# Patient Record
Sex: FEMALE | Race: WHITE | HISPANIC OR LATINO | Employment: FULL TIME | URBAN - METROPOLITAN AREA
[De-identification: names, ages, dates, MRNs, and addresses within clinical notes are randomized per-mention and may not be internally consistent; named-entity substitution may affect disease eponyms.]

---
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
## 2020-02-24 ENCOUNTER — INITIAL PRENATAL (OUTPATIENT)
Dept: OBGYN | Facility: CLINIC | Age: 20
End: 2020-02-24
Payer: COMMERCIAL

## 2020-02-24 VITALS
DIASTOLIC BLOOD PRESSURE: 72 MMHG | BODY MASS INDEX: 29.59 KG/M2 | HEIGHT: 66 IN | WEIGHT: 184.1 LBS | SYSTOLIC BLOOD PRESSURE: 118 MMHG

## 2020-02-24 DIAGNOSIS — N93.8 DUB (DYSFUNCTIONAL UTERINE BLEEDING): ICD-10-CM

## 2020-02-24 DIAGNOSIS — Z32.01 POSITIVE PREGNANCY TEST: ICD-10-CM

## 2020-02-24 DIAGNOSIS — R11.0 NAUSEA: ICD-10-CM

## 2020-02-24 PROCEDURE — 76830 TRANSVAGINAL US NON-OB: CPT | Performed by: OBSTETRICS & GYNECOLOGY

## 2020-02-24 PROCEDURE — 99203 OFFICE O/P NEW LOW 30 MIN: CPT | Mod: 25 | Performed by: OBSTETRICS & GYNECOLOGY

## 2020-02-24 NOTE — PROGRESS NOTES
today  UPT: positive  LMP: 12/30/19 exact  PNV: yes  Phone #[phone]   Pharmacy verified with patient  A little bit of nausea

## 2020-02-24 NOTE — PROGRESS NOTES
"Subjective:      Mary Ann Cruz is a 19 y.o. female who presents with amenorrhea and positive home pregnancy test            HPI patient is a 19-year-old G1 who presents today with amenorrhea and positive home pregnancy test.  She is doing well currently.  She reports a few episodes of mild nausea in the morning without vomiting.  She denies any abdominal or pelvic pain or cramping she.  Denies any bleeding or spotting.  Ports normal bowel and bladder functions.  Patient started prenatal vitamins a month ago.  Pregnancy was unplanned but desired-patient was not using any contraception    ROS all organ systems were reviewed and were negative except for complaints in HPI       Objective:     /72   Ht 1.676 m (5' 6\")   Wt 83.5 kg (184 lb 1.6 oz)   LMP 12/30/2019 (Exact Date)   BMI 29.71 kg/m²      Physical Exam  Vitals signs and nursing note reviewed. Exam conducted with a chaperone present.   Constitutional:       General: She is not in acute distress.     Appearance: Normal appearance. She is not toxic-appearing.   HENT:      Head: Normocephalic and atraumatic.   Eyes:      General:         Right eye: No discharge.         Left eye: No discharge.      Conjunctiva/sclera: Conjunctivae normal.   Neck:      Musculoskeletal: Neck supple. No neck rigidity or muscular tenderness.   Cardiovascular:      Rate and Rhythm: Normal rate and regular rhythm.   Pulmonary:      Effort: Pulmonary effort is normal. No respiratory distress.      Breath sounds: Normal breath sounds. No wheezing.   Abdominal:      General: Abdomen is flat. Bowel sounds are normal. There is no distension.      Palpations: Abdomen is soft.      Tenderness: There is no abdominal tenderness. There is no right CVA tenderness or left CVA tenderness.   Genitourinary:     General: Normal vulva.      Vagina: No vaginal discharge.   Musculoskeletal: Normal range of motion.         General: No swelling or deformity.   Lymphadenopathy:    "   Cervical: No cervical adenopathy.   Skin:     General: Skin is warm and dry.      Coloration: Skin is not jaundiced.      Findings: No bruising.   Neurological:      General: No focal deficit present.      Mental Status: She is alert and oriented to person, place, and time.      Cranial Nerves: No cranial nerve deficit.      Motor: No weakness.      Gait: Gait normal.   Psychiatric:         Mood and Affect: Mood normal.         Behavior: Behavior normal.         Thought Content: Thought content normal.         Judgment: Judgment normal.            Transvaginal ultrasound was performed and read by me:    Hansen intrauterine pregnancy noted  Fetal heart rate was 171 bpm  Crown-rump length measurement gives a gestational age of 8 weeks and 1 day  EDC by CRL is 10/4/2020  EDC by LMP is 10/5/2020  No adnexal masses noted  No pelvic free fluid noted    Impression: Hansen intrauterine pregnancy at 8 weeks and 1 day gestation with final EDC of 10/5/2020     Assessment/Plan:       1.  19-year-old G1 presented with amenorrhea and positive home pregnancy test.  Normal intrauterine pregnancy confirmed at 8 weeks and 1 day gestation    - POCT Pregnancy  Ultrasound findings discussed  Pregnancy precautions and restrictions were reviewed  Patient to continue prenatal vitamins  2. Positive pregnancy test      3. Nausea  Treatment discussed including dietary modification    4.  Precautions and plan of care reviewed.  Patient to follow-up in 2 weeks for new OB

## 2020-03-04 ENCOUNTER — OFFICE VISIT (OUTPATIENT)
Dept: URGENT CARE | Facility: CLINIC | Age: 20
End: 2020-03-04
Payer: COMMERCIAL

## 2020-03-04 VITALS
WEIGHT: 182.2 LBS | DIASTOLIC BLOOD PRESSURE: 74 MMHG | HEIGHT: 66 IN | RESPIRATION RATE: 12 BRPM | OXYGEN SATURATION: 97 % | HEART RATE: 111 BPM | BODY MASS INDEX: 29.28 KG/M2 | SYSTOLIC BLOOD PRESSURE: 112 MMHG | TEMPERATURE: 99.4 F

## 2020-03-04 DIAGNOSIS — J11.1 INFLUENZA-LIKE ILLNESS: ICD-10-CM

## 2020-03-04 PROCEDURE — 99214 OFFICE O/P EST MOD 30 MIN: CPT | Performed by: NURSE PRACTITIONER

## 2020-03-04 RX ORDER — OSELTAMIVIR PHOSPHATE 75 MG/1
75 CAPSULE ORAL 2 TIMES DAILY
Qty: 10 CAP | Refills: 0 | Status: SHIPPED | OUTPATIENT
Start: 2020-03-04 | End: 2020-03-09

## 2020-03-04 ASSESSMENT — ENCOUNTER SYMPTOMS
ABDOMINAL PAIN: 0
EYE DISCHARGE: 0
EYE REDNESS: 0
SHORTNESS OF BREATH: 0
FEVER: 1
COUGH: 1
PALPITATIONS: 0
HEADACHES: 0
WHEEZING: 0
MYALGIAS: 1
SORE THROAT: 0
STRIDOR: 0
CHILLS: 1
DIARRHEA: 0
CONSTIPATION: 0
NAUSEA: 1
VOMITING: 0

## 2020-03-04 NOTE — LETTER
March 4, 2020         Patient: Mary Ann Cruz   YOB: 2000   Date of Visit: 3/4/2020           To Whom it May Concern:    Mary Ann Cruz was seen in my clinic on 3/4/2020. Please excuse her from work 3/4/2020 through 3/7/2020. She may return on 3/8/2020.    If you have any questions or concerns, please don't hesitate to call.        Sincerely,           WILDA Mejia.  Electronically Signed

## 2020-03-05 NOTE — PROGRESS NOTES
Subjective:   Mary Ann Cruz is a 19 y.o. female who presents for Cough (x 3 days.  Pt. has cough, congestion, bodyaches and bilateral plugged ears. Pt. is 9 weeks pregnant.)        Cough   This is a new problem. The current episode started in the past 7 days (Started 2 days ago). The problem has been waxing and waning. The problem occurs hourly. The cough is non-productive. Associated symptoms include chills, ear congestion, a fever, myalgias, nasal congestion and postnasal drip. Pertinent negatives include no chest pain, ear pain, eye redness, headaches, rash, sore throat, shortness of breath or wheezing. She has tried nothing for the symptoms. The treatment provided no relief.        Patient is currently 9 weeks pregnant.    Review of Systems   Constitutional: Positive for chills, fever and malaise/fatigue.   HENT: Positive for congestion and postnasal drip. Negative for ear discharge, ear pain and sore throat.    Eyes: Negative for discharge and redness.   Respiratory: Positive for cough. Negative for shortness of breath, wheezing and stridor.    Cardiovascular: Negative for chest pain and palpitations.   Gastrointestinal: Positive for nausea. Negative for abdominal pain, constipation, diarrhea and vomiting.   Musculoskeletal: Positive for myalgias.   Skin: Negative for itching and rash.   Neurological: Negative for headaches.   All other systems reviewed and are negative.    PMH:  has no past medical history of Addisons disease ([facility]), Adrenal disorder ([facility]), Allergy, Anemia, Anxiety, Arrhythmia, Arthritis, Asthma, Blood transfusion without reported diagnosis, Cancer ([facility]), Cataract, CHF (congestive heart failure) ([facility]), Clotting disorder ([facility]), COPD (chronic obstructive pulmonary disease) ([facility]), Cushings syndrome ([facility]), Depression, Diabetes ([facility]), Diabetic neuropathy ([facility]), GERD (gastroesophageal reflux disease), Glaucoma, Goiter, Head ache, Heart attack ([facility]), Heart murmur, HIV (human  "immunodeficiency virus infection) (HCC), Hyperlipidemia, Hypertension, IBD (inflammatory bowel disease), Kidney disease, Meningitis, Migraine, Muscle disorder, Osteoporosis, Parathyroid disorder (HCC), Pituitary disease (HCC), Pulmonary emphysema (HCC), Seizure (HCC), Sickle cell disease (HCC), Stroke (HCC), Substance abuse (HCC), Thyroid disease, Tuberculosis, or Urinary tract infection.  ALLERGIES: No Known Allergies    Patient's PMH, SocHx, SurgHx, FamHx, Drug allergies and medications reviewed.     Objective:   /74   Pulse (!) 111   Temp 37.4 °C (99.4 °F) (Temporal)   Resp 12   Ht 1.676 m (5' 6\")   Wt 82.6 kg (182 lb 3.2 oz)   LMP 12/30/2019 (Exact Date)   SpO2 97%   BMI 29.41 kg/m²   Physical Exam  Vitals signs reviewed.   Constitutional:       Appearance: She is well-developed.   HENT:      Head: Normocephalic.      Right Ear: Hearing, tympanic membrane and ear canal normal. No middle ear effusion. Tympanic membrane is not perforated or erythematous.      Left Ear: Hearing, tympanic membrane and ear canal normal.  No middle ear effusion. Tympanic membrane is not perforated or erythematous.      Nose: Mucosal edema and congestion present. No rhinorrhea.      Right Sinus: No maxillary sinus tenderness or frontal sinus tenderness.      Left Sinus: No maxillary sinus tenderness or frontal sinus tenderness.      Mouth/Throat:      Lips: Pink.      Mouth: Mucous membranes are moist.      Pharynx: Uvula midline. Oropharyngeal exudate present. No posterior oropharyngeal erythema or uvula swelling.      Tonsils: No tonsillar exudate. 2+ on the right. 2+ on the left.   Eyes:      General: Lids are normal.      Extraocular Movements: Extraocular movements intact.      Conjunctiva/sclera: Conjunctivae normal.      Pupils: Pupils are equal, round, and reactive to light.   Neck:      Musculoskeletal: Normal range of motion.      Thyroid: No thyromegaly.   Cardiovascular:      Rate and Rhythm: Normal rate and " regular rhythm.      Heart sounds: Normal heart sounds.   Pulmonary:      Effort: Pulmonary effort is normal. No tachypnea, bradypnea, accessory muscle usage, prolonged expiration or respiratory distress.      Breath sounds: Normal breath sounds. No wheezing.   Lymphadenopathy:      Head:      Right side of head: No submandibular or tonsillar adenopathy.      Left side of head: Submandibular adenopathy present. No tonsillar adenopathy.   Skin:     General: Skin is warm and dry.      Findings: No rash.   Neurological:      Mental Status: She is alert and oriented to person, place, and time.   Psychiatric:         Mood and Affect: Mood normal.         Speech: Speech normal.         Behavior: Behavior normal. Behavior is cooperative.         Thought Content: Thought content normal.         Judgment: Judgment normal.           Assessment/Plan:   Assessment    1. Influenza-like illness  oseltamivir (TAMIFLU) 75 MG Cap     Patient concerned for costs of flu swab and we have agreed to not swab and treat for influenza at this time. May take OTC Tylenol for fever. Strict ER precautions discussed    Differential diagnosis, natural history, supportive care, and indications for immediate follow-up discussed.     **Please note that all invasive procedures during this visit were performed by myself and/or the Medical Assistant under the supervision of the PA or MD in office**

## 2020-05-04 ENCOUNTER — HOSPITAL ENCOUNTER (OUTPATIENT)
Dept: LAB | Facility: MEDICAL CENTER | Age: 20
End: 2020-05-04
Attending: SPECIALIST
Payer: OTHER GOVERNMENT

## 2020-05-04 LAB
ABO GROUP BLD: NORMAL
BLD GP AB SCN SERPL QL: NORMAL
HBV SURFACE AG SER QL: NORMAL
HCT VFR BLD AUTO: 36.8 % (ref 37–47)
HCV AB SER QL: NORMAL
HGB BLD-MCNC: 12.7 G/DL (ref 12–16)
HIV 1+2 AB+HIV1 P24 AG SERPL QL IA: NORMAL
RH BLD: NORMAL
RUBV AB SER QL: 40.4 IU/ML
TREPONEMA PALLIDUM IGG+IGM AB [PRESENCE] IN SERUM OR PLASMA BY IMMUNOASSAY: NORMAL

## 2020-05-04 PROCEDURE — 81511 FTL CGEN ABNOR FOUR ANAL: CPT

## 2020-05-04 PROCEDURE — 86900 BLOOD TYPING SEROLOGIC ABO: CPT

## 2020-05-04 PROCEDURE — 85014 HEMATOCRIT: CPT

## 2020-05-04 PROCEDURE — 86850 RBC ANTIBODY SCREEN: CPT

## 2020-05-04 PROCEDURE — 86901 BLOOD TYPING SEROLOGIC RH(D): CPT

## 2020-05-04 PROCEDURE — 85018 HEMOGLOBIN: CPT

## 2020-05-04 PROCEDURE — 86803 HEPATITIS C AB TEST: CPT

## 2020-05-04 PROCEDURE — 86780 TREPONEMA PALLIDUM: CPT

## 2020-05-04 PROCEDURE — 86762 RUBELLA ANTIBODY: CPT

## 2020-05-04 PROCEDURE — 87389 HIV-1 AG W/HIV-1&-2 AB AG IA: CPT

## 2020-05-04 PROCEDURE — 87340 HEPATITIS B SURFACE AG IA: CPT

## 2020-05-04 PROCEDURE — 36415 COLL VENOUS BLD VENIPUNCTURE: CPT

## 2020-05-07 LAB
# FETUSES US: NORMAL
AFP MOM SERPL: 0.67
AFP SERPL-MCNC: 24 NG/ML
AGE - REPORTED: 20.4 YR
CURRENT SMOKER: NO
FAMILY MEMBER DISEASES HX: NO
GA METHOD: NORMAL
GA: NORMAL WK
HCG MOM SERPL: 0.35
HCG SERPL-ACNC: 7864 IU/L
HX OF HEREDITARY DISORDERS: NO
IDDM PATIENT QL: NO
INHIBIN A MOM SERPL: 0.45
INHIBIN A SERPL-MCNC: 61 PG/ML
INTEGRATED SCN PATIENT-IMP: NORMAL
PATHOLOGY STUDY: NORMAL
SPECIMEN DRAWN SERPL: NORMAL
U ESTRIOL MOM SERPL: 1.17
U ESTRIOL SERPL-MCNC: 1.7 NG/ML

## 2020-06-14 ENCOUNTER — HOSPITAL ENCOUNTER (OUTPATIENT)
Facility: MEDICAL CENTER | Age: 20
End: 2020-06-14
Attending: SPECIALIST | Admitting: SPECIALIST
Payer: OTHER GOVERNMENT

## 2020-06-14 VITALS
HEART RATE: 89 BPM | SYSTOLIC BLOOD PRESSURE: 115 MMHG | HEIGHT: 66 IN | RESPIRATION RATE: 16 BRPM | DIASTOLIC BLOOD PRESSURE: 75 MMHG | OXYGEN SATURATION: 98 % | WEIGHT: 190 LBS | BODY MASS INDEX: 30.53 KG/M2

## 2020-06-14 LAB
APPEARANCE UR: ABNORMAL
COLOR UR AUTO: YELLOW
GLUCOSE UR QL STRIP.AUTO: NEGATIVE MG/DL
KETONES UR QL STRIP.AUTO: NEGATIVE MG/DL
LEUKOCYTE ESTERASE UR QL STRIP.AUTO: ABNORMAL
NITRITE UR QL STRIP.AUTO: NEGATIVE
PH UR STRIP.AUTO: 7 [PH] (ref 5–8)
PROT UR QL STRIP: NEGATIVE MG/DL
RBC UR QL AUTO: NEGATIVE
SP GR UR: 1.02 (ref 1–1.03)

## 2020-06-14 PROCEDURE — 81002 URINALYSIS NONAUTO W/O SCOPE: CPT

## 2020-06-15 NOTE — DISCHARGE INSTRUCTIONS
CALL YOUR PHYSICIAN IF THE FOLLOWING SYMPTOMS OCCUR:    · Uterine like contractions or menstrual like cramps.  · Low, dull backache.  · Pressure in your vagina or lower abdomen that may feel like the baby is pushing down.   · Leaking of water or gush of fluid from the vagina.  · You have any further vaginal bleeding, as during your period.   · Decrease in fetal movement: refer to individual instructions as given by your physician.  · Pain with urination.  · Severe or continuous headache not relieved by Tylenol.  · Sudden swelling of the face and hands.    IF YOU HAVE  LABOR:    · Drink plenty of fluids.  · Lie with head of bed flat, wedged to your left or right side.   · Keep your bladder empty.  · No sexual intercourse or breast stimulation.  · Eat a well-balanced and nutritious diet.   · Keep your next appointment with your doctor.    ACTIVITY:    Pelvic Rest  Pelvic rest is sometimes recommended for women when:   · The placenta is partially or completely covering the opening of the cervix (placenta previa).  · There is bleeding between the uterine wall and the amniotic sac in the first trimester (subchorionic hemorrhage).  · The cervix begins to open without labor starting (incompetent cervix, cervical insufficiency).  · The labor is too early ( labor).  HOME CARE INSTRUCTIONS  · Do not have sexual intercourse, stimulation, or an orgasm.  · Do not use tampons, douche, or put anything in the vagina.  · Do not lift anything over 10 pounds (4.5 kg).  · Avoid strenuous activity or straining your pelvic muscles.  SEEK MEDICAL CARE IF:   · You have any further vaginal bleeding during pregnancy. Treat this as a potential emergency.  · You have cramping pain felt low in the stomach (stronger than menstrual cramps).  · You notice vaginal discharge (watery, mucus, or bloody).  · You have a low, dull backache.  · There are regular contractions or uterine tightening.       This information is not intended to  replace advice given to you by your health care provider. Make sure you discuss any questions you have with your health care provider.

## 2020-06-15 NOTE — PROGRESS NOTES
"1915- 19 y/o , EDC 10/5, EGA 23.6. Pt here for OB check with c/o bright red vaginal bleeding \"like a period\" @  today. Pt states that she noticed this VB when she wiped after using the restroom and has not had any VB since. Pt reports +FM, denies lof, and denies UC. Pt states that within the last couple of minutes her lower abdomen feels \"heavy\", pt denies vaginal pressure or any lower back pain. TOCO/EFM placed.     - Call placed to Dr. Madrigal. Report given on pt's vaginal bleeding/complaints, assessment and FHR tracing. Orders received to place chucks pad under pt and to monitor for one hour for any further bleeding. If pt no longer has any bleeding she may go home on bedrest/ labor precautions and to follow up w/ Dr. Maddox. MD does not desire exam/US at this time. Orders also received to d/c FHR monitoring after reactive tracing.    - Pt up to bathroom, no bleeding when pt wiped. Urine dipped, unremarkable findings.     - No further bleeding and pt is stable at this time. Pt states she feels comfortable to go home.     - Pt given general, pre-term labor and pelvic rest discharge instructions. Pt instructed to follow up w/ Dr. Maddox and to return to L&D if symptoms return. Pt verbalizes understanding and all questions are answered. Pt ambulates off floor w/ FOB in stable condition.   "

## 2020-08-23 ENCOUNTER — HOSPITAL ENCOUNTER (OUTPATIENT)
Facility: MEDICAL CENTER | Age: 20
End: 2020-08-23
Attending: SPECIALIST | Admitting: SPECIALIST
Payer: OTHER GOVERNMENT

## 2020-08-23 VITALS
HEART RATE: 106 BPM | RESPIRATION RATE: 18 BRPM | TEMPERATURE: 98 F | OXYGEN SATURATION: 99 % | DIASTOLIC BLOOD PRESSURE: 78 MMHG | SYSTOLIC BLOOD PRESSURE: 126 MMHG

## 2020-08-23 PROCEDURE — 59025 FETAL NON-STRESS TEST: CPT

## 2020-08-24 NOTE — PROGRESS NOTES
PT arrived with CO decreased FM, she sated she felt no movement all day up until 30 mins ago. At that time she felt one small movement and nothing since. She also CO lower ABD pressure and cramping. PT did say she had intercourse in last 24 hours.     1820 Report to sherman Alexander to MD when reactive tracing obtained.     1855 RN at , PT stated that she is still not feeling FM. She stated that she has had 2 movements all day.    1900 Report to Lori VICK

## 2020-08-24 NOTE — DISCHARGE INSTRUCTIONS
Labor Instructions (37 - 39 weeks):  Call your physician or return to hospital if:  · You have regular contractions that get progressively closer, longer and stronger.  · Your water breaks (remember time and color).  · You have bleeding like a period.  · Your baby does not move enough to complete the daily kick counts (10 movements in 2 hours)  · Your baby moves much less often than on the days before or you have not felt your baby move all day.      Fetal Movement Counts  Patient Name: ________________________________________________ Patient Due Date: ____________________  What is a fetal movement count?    A fetal movement count is the number of times that you feel your baby move during a certain amount of time. This may also be called a fetal kick count. A fetal movement count is recommended for every pregnant woman. You may be asked to start counting fetal movements as early as week 28 of your pregnancy.  Pay attention to when your baby is most active. You may notice your baby's sleep and wake cycles. You may also notice things that make your baby move more. You should do a fetal movement count:  · When your baby is normally most active.  · At the same time each day.  A good time to count movements is while you are resting, after having something to eat and drink.  How do I count fetal movements?  1. Find a quiet, comfortable area. Sit, or lie down on your side.  2. Write down the date, the start time and stop time, and the number of movements that you felt between those two times. Take this information with you to your health care visits.  3. For 2 hours, count kicks, flutters, swishes, rolls, and jabs. You should feel at least 10 movements during 2 hours.  4. You may stop counting after you have felt 10 movements.  5. If you do not feel 10 movements in 2 hours, have something to eat and drink. Then, keep resting and counting for 1 hour. If you feel at least 4 movements during that hour, you may stop  counting.  Contact a health care provider if:  · You feel fewer than 4 movements in 2 hours.  · Your baby is not moving like he or she usually does.  Date: ____________ Start time: ____________ Stop time: ____________ Movements: ____________  Date: ____________ Start time: ____________ Stop time: ____________ Movements: ____________  Date: ____________ Start time: ____________ Stop time: ____________ Movements: ____________  Date: ____________ Start time: ____________ Stop time: ____________ Movements: ____________  Date: ____________ Start time: ____________ Stop time: ____________ Movements: ____________  Date: ____________ Start time: ____________ Stop time: ____________ Movements: ____________  Date: ____________ Start time: ____________ Stop time: ____________ Movements: ____________  Date: ____________ Start time: ____________ Stop time: ____________ Movements: ____________  Date: ____________ Start time: ____________ Stop time: ____________ Movements: ____________  This information is not intended to replace advice given to you by your health care provider. Make sure you discuss any questions you have with your health care provider.  Document Released: 01/17/2008 Document Revised: 01/07/2020 Document Reviewed: 01/26/2017  [employer] Patient Education © 2020 [employer]

## (undated) DEVICE — CANNULA W/SEAL 5X100 Z-THRE - ADED KII (12/BX)

## (undated) DEVICE — DRAPESURG STERI-DRAPE LONG - (10/BX 4BX/CA)

## (undated) DEVICE — DRAPE STRLE REG TOWEL 18X24 - (10/BX 4BX/CA)"

## (undated) DEVICE — GLOVE BIOGEL SZ 7 SURGICAL PF LTX - (50PR/BX 4BX/CA)

## (undated) DEVICE — MASK ANESTHESIA ADULT  - (100/CA)

## (undated) DEVICE — CHLORAPREP 26 ML APPLICATOR - ORANGE TINT(25/CA)

## (undated) DEVICE — SET EXTENSION WITH 2 PORTS (48EA/CA) ***PART #2C8610 IS A SUBSTITUTE*****

## (undated) DEVICE — LACTATED RINGERS INJ 1000 ML - (14EA/CA 60CA/PF)

## (undated) DEVICE — SENSOR SPO2 NEO LNCS ADHESIVE (20/BX) SEE USER NOTES

## (undated) DEVICE — TUBE CONNECT SUCTION CLEAR 120 X 1/4" (50EA/CA)"

## (undated) DEVICE — SUTURE GENERAL

## (undated) DEVICE — DERMABOND ADVANCED - (12EA/BX)

## (undated) DEVICE — TROCAR Z THREAD 11 X 100 - BLADED (6/BX)

## (undated) DEVICE — SUTURE 4-0 VICRYL PLUSFS-1 - 27 INCH (36/BX)

## (undated) DEVICE — GOWN WARMING STANDARD FLEX - (30/CA)

## (undated) DEVICE — DEVICE SUTURING NON-SAFETY ENDO CLOSE (12/BX)

## (undated) DEVICE — KIT ANESTHESIA W/CIRCUIT & 3/LT BAG W/FILTER (20EA/CA)

## (undated) DEVICE — HANDPIECE 10FT INTPLS SCT PLS IRRIGATION HAND CONTROL SET (6/PK)

## (undated) DEVICE — SET LEADWIRE 5 LEAD BEDSIDE DISPOSABLE ECG (1SET OF 5/EA)

## (undated) DEVICE — TUBING CLEARLINK DUO-VENT - C-FLO (48EA/CA)

## (undated) DEVICE — SUTURE 3-0 VICRYL PLUS SH - 8X 18 INCH (12/BX)

## (undated) DEVICE — WATER IRRIG. STER. 1500 ML - (9/CA)

## (undated) DEVICE — ELECTRODE 850 FOAM ADHESIVE - HYDROGEL RADIOTRNSPRNT (50/PK)

## (undated) DEVICE — SUTURE 4-0 30CM STRATAFIX SPIRAL PS-2 (12EA/BX)

## (undated) DEVICE — SUTURE 0 VICRYL PLUS UR-6 - 27 INCH (36/BX)

## (undated) DEVICE — DETERGENT RENUZYME PLUS 10 OZ PACKET (50/BX)

## (undated) DEVICE — BAG RETRIEVAL 10ML (10EA/BX)

## (undated) DEVICE — HEAD HOLDER JUNIOR/ADULT

## (undated) DEVICE — TUBING INSUFFLATION - (10/BX)

## (undated) DEVICE — TROCAR 5X100 NON BLADED Z-TH - READ KII (6/BX)

## (undated) DEVICE — PROTECTOR ULNA NERVE - (36PR/CA)

## (undated) DEVICE — SET SUCTION/IRRIGATION WITH DISPOSABLE TIP (6/CA )PART #0250-070-520 IS A SUB

## (undated) DEVICE — ELECTRODE DUAL RETURN W/ CORD - (50/PK)

## (undated) DEVICE — NEPTUNE 4 PORT MANIFOLD - (20/PK)

## (undated) DEVICE — Device

## (undated) DEVICE — SODIUM CHL IRRIGATION 0.9% 1000ML (12EA/CA)

## (undated) DEVICE — SUTURE 2-0 ETHIBOND GREEN CT-2 TAPER (36PK/BX)

## (undated) DEVICE — CANISTER SUCTION 3000ML MECHANICAL FILTER AUTO SHUTOFF MEDI-VAC NONSTERILE LF DISP  (40EA/CA)

## (undated) DEVICE — PACK LAP CHOLE OR - (2EA/CA)

## (undated) DEVICE — KIT ROOM DECONTAMINATION

## (undated) DEVICE — SUCTION INSTRUMENT YANKAUER BULBOUS TIP W/O VENT (50EA/CA)

## (undated) DEVICE — GOWN SURGEONS LARGE - (32/CA)

## (undated) DEVICE — CLIP MED LG INTNL HRZN TI ESCP - (20/BX)